# Patient Record
Sex: MALE | Race: WHITE | NOT HISPANIC OR LATINO | Employment: FULL TIME | URBAN - METROPOLITAN AREA
[De-identification: names, ages, dates, MRNs, and addresses within clinical notes are randomized per-mention and may not be internally consistent; named-entity substitution may affect disease eponyms.]

---

## 2019-03-16 ENCOUNTER — OFFICE VISIT (OUTPATIENT)
Dept: URGENT CARE | Facility: CLINIC | Age: 39
End: 2019-03-16
Payer: COMMERCIAL

## 2019-03-16 ENCOUNTER — APPOINTMENT (OUTPATIENT)
Dept: RADIOLOGY | Facility: CLINIC | Age: 39
End: 2019-03-16
Payer: COMMERCIAL

## 2019-03-16 VITALS
HEIGHT: 74 IN | DIASTOLIC BLOOD PRESSURE: 78 MMHG | RESPIRATION RATE: 18 BRPM | WEIGHT: 206 LBS | BODY MASS INDEX: 26.44 KG/M2 | SYSTOLIC BLOOD PRESSURE: 145 MMHG | HEART RATE: 98 BPM | TEMPERATURE: 98.7 F | OXYGEN SATURATION: 96 %

## 2019-03-16 DIAGNOSIS — S59.902A INJURY OF LEFT ELBOW, INITIAL ENCOUNTER: ICD-10-CM

## 2019-03-16 DIAGNOSIS — S59.902A INJURY OF LEFT ELBOW, INITIAL ENCOUNTER: Primary | ICD-10-CM

## 2019-03-16 PROCEDURE — 99203 OFFICE O/P NEW LOW 30 MIN: CPT | Performed by: PHYSICIAN ASSISTANT

## 2019-03-16 PROCEDURE — 73080 X-RAY EXAM OF ELBOW: CPT

## 2019-03-16 RX ORDER — LORAZEPAM 1 MG/1
TABLET ORAL
COMMUNITY
Start: 2019-01-14

## 2019-03-16 RX ORDER — DEXTROAMPHETAMINE SACCHARATE, AMPHETAMINE ASPARTATE, DEXTROAMPHETAMINE SULFATE AND AMPHETAMINE SULFATE 3.75; 3.75; 3.75; 3.75 MG/1; MG/1; MG/1; MG/1
TABLET ORAL
COMMUNITY
Start: 2019-02-13 | End: 2020-08-31 | Stop reason: ALTCHOICE

## 2019-03-16 RX ORDER — LAMOTRIGINE 200 MG/1
TABLET ORAL
COMMUNITY
Start: 2019-02-13 | End: 2020-08-31 | Stop reason: ALTCHOICE

## 2019-03-16 NOTE — PATIENT INSTRUCTIONS
RICE (rest, ice, compression, elevation) measures as discussed  Rest and elevate injured area as much as possible  Ice for 20-30 minutes at a time, 3-4 times per day  You may attempt to wear a compression device during all waking hours if swelling increases  Take  Advil or Tylenol as directed for pain  Follow up with your family doctor in 3-5 days  Proceed to the ER if symptoms worsen

## 2019-03-16 NOTE — PROGRESS NOTES
[facility]        NAME: Jazmin Mims is a 41 y o  male  : 1980    MRN: [id number]  DATE: 2019  TIME: 3:10 PM    Assessment and Plan   Injury of left elbow, initial encounter [Z41 902A]  1  Injury of left elbow, initial encounter  XR elbow 3+ vw left     Patient Instructions   RICE (rest, ice, compression, elevation) measures as discussed  Rest and elevate injured area as much as possible  Ice for 20-30 minutes at a time, 3-4 times per day  You may attempt to wear a compression device during all waking hours if swelling increases  Take  Advil or Tylenol as directed for pain  Follow up with your family doctor in 3-5 days  Proceed to the ER if symptoms worsen  The diagnosis, etiology, expected course of injury, and treatment plan were reviewed  All questions answered  Precautions given  Patient verbalized understanding and agreement the treatment plan  Chief Complaint     Chief Complaint   Patient presents with    Arm Pain     Pt reports of left elbow pain from a fall occurred approx 1 5 hours  Pt denies any other injuries or LOC  History of Present Illness   70-year-old male presenting with complaint of left elbow pain x1 5 hours  Patient reportedly slipped on a carpeted stair while going downstairs, and fell hitting his left elbow on an uncovered portions  He notes the carpeting is not nailed down, and is thus mobile  He notes immediate pain of the elbow with slight redness, but otherwise denies any swelling, bruising, N/T/W  He notes it is painful with movement but denies limitation of ROM  No previous injury  Review of Systems   Review of Systems   Constitutional: Negative for chills, diaphoresis, fatigue and fever  Respiratory: Negative for cough, shortness of breath and wheezing  Cardiovascular: Negative for chest pain and palpitations  Gastrointestinal: Negative for abdominal distention, nausea and vomiting  Musculoskeletal: Negative for myalgias  Skin: Negative for rash  Neurological: Negative for dizziness, seizures, syncope, weakness, light-headedness, numbness and headaches  Psychiatric/Behavioral: Negative for confusion and decreased concentration  Current Medications       Current Outpatient Medications:     amphetamine-dextroamphetamine (ADDERALL) 15 MG tablet, , Disp: , Rfl:     lamoTRIgine (LaMICtal) 150 MG tablet, , Disp: , Rfl:     LORazepam (ATIVAN) 1 mg tablet, , Disp: , Rfl:     Current Allergies     Allergies as of 03/16/2019    (No Known Allergies)            The following portions of the patient's history were reviewed and updated as appropriate: allergies, current medications, past family history, past medical history, past social history, past surgical history and problem list      Past Medical History:   Diagnosis Date    Anxiety     Bipolar 2 disorder ([facility] [address] )     Depression        History reviewed  No pertinent surgical history  History reviewed  No pertinent family history  Medications have been verified  Objective   /78   Pulse 98   Temp 98 7 °F (37 1 °C)   Resp 18   Ht 6' 2" (1 88 m)   Wt 93 4 kg (206 lb)   SpO2 96%   BMI 26 45 kg/m²     Left elbow XR: No acute osseous abn  Physical Exam     Physical Exam   Constitutional: He is oriented to person, place, and time  Vital signs are normal  He appears well-developed and well-nourished  He is cooperative  He does not appear ill  No distress  HENT:   Head: Normocephalic and atraumatic  Eyes: Conjunctivae and lids are normal  Right eye exhibits no chemosis, no discharge and no exudate  Left eye exhibits no chemosis, no discharge and no exudate  Right conjunctiva is not injected  Left conjunctiva is not injected  Cardiovascular: Normal rate, regular rhythm and normal heart sounds  Exam reveals no gallop, no distant heart sounds and no friction rub  No murmur heard  Pulmonary/Chest: Effort normal and breath sounds normal  No stridor   No respiratory distress  He has no decreased breath sounds  He has no wheezes  He has no rhonchi  He has no rales  Abdominal: Soft  Bowel sounds are normal  He exhibits no distension and no mass  There is no tenderness  There is no rigidity, no rebound and no guarding  Musculoskeletal:        Left elbow: He exhibits laceration (superficial abrasion of the olecranon process  )  He exhibits normal range of motion, no swelling, no effusion and no deformity  Tenderness found  Radial head and lateral epicondyle tenderness noted  No medial epicondyle and no olecranon process tenderness noted  Mild erythema of the posterior elbow  No overt deformity or other overlying skin abnormality  No crepitus  UE DTR's +2, symmetrical  Bicep and triceps strength 5/5   strength 5/5  Neurological: He is alert and oriented to person, place, and time  He has normal strength  He is not disoriented  No cranial nerve deficit  He exhibits normal muscle tone  Coordination and gait normal    Skin: Skin is warm, dry and intact  No rash noted  He is not diaphoretic  No erythema  No pallor  Psychiatric: He has a normal mood and affect  His behavior is normal  Judgment and thought content normal    Nursing note and vitals reviewed

## 2020-08-07 ENCOUNTER — TELEPHONE (OUTPATIENT)
Dept: UROLOGY | Facility: MEDICAL CENTER | Age: 40
End: 2020-08-07

## 2020-08-07 NOTE — TELEPHONE ENCOUNTER
Tried calling pt at number given to make new pt appt and got recording saying number is not in service

## 2020-08-07 NOTE — TELEPHONE ENCOUNTER
Please Triage - Tesitcular/ Penile Pain  New Patient- [place]    What is the reason for the patients appointment? Pain in base of penis and Testicle  Patient would like appt ASAP  Unable to locate appt to accomodate     Do we accept the patient's insurance or is the patient Self-Pay? Provider:  [employer] ID#: [id number]      Has the patient had any previous urologist(s)?  no     Have patient records been requested?  no     Has the patient had any outside testing done?  no     What is the patients location preference for an office visit?  [place]    Does the patient have a personal history of cancer?  no    (If no cancer hx   ) Is the patient ok with seeing Advanced Practitioner?  yes    Patient can be reached at : [phone]

## 2020-08-12 NOTE — TELEPHONE ENCOUNTER
Tried to call patient - unable to leave message due to phone not being in service  Will send him an email to call the office back

## 2020-08-27 ENCOUNTER — TELEPHONE (OUTPATIENT)
Dept: UROLOGY | Facility: MEDICAL CENTER | Age: 40
End: 2020-08-27

## 2020-08-27 ENCOUNTER — OFFICE VISIT (OUTPATIENT)
Dept: FAMILY MEDICINE CLINIC | Facility: CLINIC | Age: 40
End: 2020-08-27
Payer: COMMERCIAL

## 2020-08-27 VITALS
WEIGHT: 205 LBS | HEIGHT: 73 IN | DIASTOLIC BLOOD PRESSURE: 74 MMHG | TEMPERATURE: 98.2 F | OXYGEN SATURATION: 97 % | SYSTOLIC BLOOD PRESSURE: 116 MMHG | HEART RATE: 81 BPM | BODY MASS INDEX: 27.17 KG/M2

## 2020-08-27 DIAGNOSIS — R30.0 DYSURIA: ICD-10-CM

## 2020-08-27 DIAGNOSIS — N50.82 SCROTAL PAIN: Primary | ICD-10-CM

## 2020-08-27 LAB
BACTERIA UR QL AUTO: ABNORMAL /HPF
BILIRUB UR QL STRIP: NEGATIVE
CLARITY UR: ABNORMAL
COLOR UR: ABNORMAL
GLUCOSE UR STRIP-MCNC: NEGATIVE MG/DL
HGB UR QL STRIP.AUTO: ABNORMAL
HYALINE CASTS #/AREA URNS LPF: ABNORMAL /LPF
KETONES UR STRIP-MCNC: NEGATIVE MG/DL
LEUKOCYTE ESTERASE UR QL STRIP: NEGATIVE
NITRITE UR QL STRIP: NEGATIVE
NON-SQ EPI CELLS URNS QL MICRO: ABNORMAL /HPF
PH UR STRIP.AUTO: 6.5 [PH]
PROT UR STRIP-MCNC: NEGATIVE MG/DL
RBC #/AREA URNS AUTO: ABNORMAL /HPF
SL AMB  POCT GLUCOSE, UA: NEGATIVE
SL AMB LEUKOCYTE ESTERASE,UA: NEGATIVE
SL AMB POCT BILIRUBIN,UA: NEGATIVE
SL AMB POCT BLOOD,UA: ABNORMAL
SL AMB POCT CLARITY,UA: CLEAR
SL AMB POCT COLOR,UA: YELLOW
SL AMB POCT KETONES,UA: NEGATIVE
SL AMB POCT NITRITE,UA: NEGATIVE
SL AMB POCT PH,UA: 5
SL AMB POCT SPECIFIC GRAVITY,UA: 1.02
SL AMB POCT URINE PROTEIN: 15
SL AMB POCT UROBILINOGEN: 0.2
SP GR UR STRIP.AUTO: 1.02 (ref 1–1.03)
UROBILINOGEN UR QL STRIP.AUTO: 0.2 E.U./DL
WBC #/AREA URNS AUTO: ABNORMAL /HPF

## 2020-08-27 PROCEDURE — 81001 URINALYSIS AUTO W/SCOPE: CPT | Performed by: FAMILY MEDICINE

## 2020-08-27 PROCEDURE — 99214 OFFICE O/P EST MOD 30 MIN: CPT | Performed by: FAMILY MEDICINE

## 2020-08-27 PROCEDURE — 1036F TOBACCO NON-USER: CPT | Performed by: FAMILY MEDICINE

## 2020-08-27 PROCEDURE — 87591 N.GONORRHOEAE DNA AMP PROB: CPT | Performed by: FAMILY MEDICINE

## 2020-08-27 PROCEDURE — 3008F BODY MASS INDEX DOCD: CPT | Performed by: FAMILY MEDICINE

## 2020-08-27 PROCEDURE — 87086 URINE CULTURE/COLONY COUNT: CPT | Performed by: FAMILY MEDICINE

## 2020-08-27 PROCEDURE — 81002 URINALYSIS NONAUTO W/O SCOPE: CPT | Performed by: FAMILY MEDICINE

## 2020-08-27 PROCEDURE — 87491 CHLMYD TRACH DNA AMP PROBE: CPT | Performed by: FAMILY MEDICINE

## 2020-08-27 RX ORDER — DEXMETHYLPHENIDATE HYDROCHLORIDE 10 MG/1
TABLET ORAL
COMMUNITY
Start: 2020-07-21

## 2020-08-27 NOTE — TELEPHONE ENCOUNTER
Please Triage - Scrotal Pain  New Patient- Cm    What is the reason for the patients appointment? Scrotal Pain - 60 days  PCP referral, calling to schedule US  Will call back with date  Do we accept the patient's insurance or is the patient Self-Pay? Provider:  [facility]  Plan: [employer]  Member ID#: [de-identified]     Has the patient had any previous urologist(s)? No     Have patient records been requested? No     Has the patient had any outside testing done? No     What is the patients location preference for an office visit? [place]    Does the patient have a personal history of cancer? No    (If no cancer hx   ) Is the patient ok with seeing Advanced Practitioner?   Yes    Patient can be reached at : [phone]

## 2020-08-27 NOTE — TELEPHONE ENCOUNTER
Patient states he has been having it for 2 months - it get to a 6-7  Not all the time  Patient has no swelling   Scheduled 8/28 @ 876

## 2020-08-28 ENCOUNTER — HOSPITAL ENCOUNTER (OUTPATIENT)
Dept: ULTRASOUND IMAGING | Facility: HOSPITAL | Age: 40
Discharge: HOME | End: 2020-08-28
Payer: COMMERCIAL

## 2020-08-28 DIAGNOSIS — N50.82 SCROTAL PAIN: ICD-10-CM

## 2020-08-28 DIAGNOSIS — R30.0 DYSURIA: ICD-10-CM

## 2020-08-28 LAB — BACTERIA UR CULT: NORMAL

## 2020-08-28 PROCEDURE — 76870 US EXAM SCROTUM: CPT

## 2020-08-28 NOTE — TELEPHONE ENCOUNTER
Was able to scheduled him with Dr Joy Santiago on Monday   Emailed to [employer] and directions

## 2020-08-28 NOTE — TELEPHONE ENCOUNTER
Patient called and advised that he is having his US today at 1pm and would like to schedule an appointment  Please advise

## 2020-08-30 LAB
C TRACH DNA SPEC QL NAA+PROBE: NEGATIVE
N GONORRHOEA DNA SPEC QL NAA+PROBE: NEGATIVE

## 2020-08-31 ENCOUNTER — OFFICE VISIT (OUTPATIENT)
Dept: UROLOGY | Facility: AMBULATORY SURGERY CENTER | Age: 40
End: 2020-08-31
Payer: COMMERCIAL

## 2020-08-31 VITALS
WEIGHT: 204.4 LBS | HEIGHT: 73 IN | SYSTOLIC BLOOD PRESSURE: 112 MMHG | TEMPERATURE: 97.8 F | HEART RATE: 67 BPM | BODY MASS INDEX: 27.09 KG/M2 | DIASTOLIC BLOOD PRESSURE: 72 MMHG

## 2020-08-31 DIAGNOSIS — R31.29 MICROSCOPIC HEMATURIA: ICD-10-CM

## 2020-08-31 DIAGNOSIS — E29.1 TESTICULAR HYPOFUNCTION: ICD-10-CM

## 2020-08-31 DIAGNOSIS — N20.0 CALCULUS OF KIDNEY: ICD-10-CM

## 2020-08-31 DIAGNOSIS — N50.819 TESTICULAR PAIN: Primary | ICD-10-CM

## 2020-08-31 PROBLEM — N50.82 SCROTAL PAIN: Status: ACTIVE | Noted: 2020-08-31

## 2020-08-31 PROBLEM — N50.82 SCROTAL PAIN: Status: RESOLVED | Noted: 2020-08-31 | Resolved: 2020-08-31

## 2020-08-31 PROBLEM — R30.0 DYSURIA: Status: ACTIVE | Noted: 2020-08-31

## 2020-08-31 LAB
SL AMB  POCT GLUCOSE, UA: ABNORMAL
SL AMB LEUKOCYTE ESTERASE,UA: ABNORMAL
SL AMB POCT BILIRUBIN,UA: ABNORMAL
SL AMB POCT BLOOD,UA: + 3
SL AMB POCT CLARITY,UA: CLEAR
SL AMB POCT COLOR,UA: YELLOW
SL AMB POCT KETONES,UA: ABNORMAL
SL AMB POCT NITRITE,UA: ABNORMAL
SL AMB POCT PH,UA: 7.5
SL AMB POCT SPECIFIC GRAVITY,UA: 1.01
SL AMB POCT URINE PROTEIN: ABNORMAL
SL AMB POCT UROBILINOGEN: ABNORMAL

## 2020-08-31 PROCEDURE — 81002 URINALYSIS NONAUTO W/O SCOPE: CPT | Performed by: UROLOGY

## 2020-08-31 PROCEDURE — 99204 OFFICE O/P NEW MOD 45 MIN: CPT | Performed by: UROLOGY

## 2020-08-31 NOTE — PROGRESS NOTES
8/31/2020    Carleen Jenkins  1980  [id number]        Assessment  Resolved right orchalgia, history of nephrolithiasis, history of fatigue and concern for low testosterone, microscopic hematuria      Discussion  I provided the patient with reassurance that his scrotal/testicular examination and ultrasound are within normal limits  With regards to his microscopic hematuria and history of nephrolithiasis, I recommend obtaining a KUB, renal bladder ultrasound, and follow-up cystoscopy in the office  We discussed that if there is any sizable stone requiring surgical intervention in the operating room that cystoscopy could be performed at that time  The patient is concerned about his fatigue and possible low testosterone level which he believes he had in the past   I will obtain a testosterone and free testosterone level at this time  History of Present Illness  42 y o  male with a history of testicular pain  A recent scrotal ultrasound showed small bilateral hydroceles without any other pathology present  Urine dip in the office today reveals 3+ blood  He denies any gross hematuria  A recent microscopic urinalysis reveals 2-4 red blood cells per high-powered field  He complains of right-sided testicular discomfort associated with masturbation  This was somewhat transient and appears to have resolved  He reports a history of nephrolithiasis  There is no prior imaging available for my review  He is also concerned about a possible low testosterone level which he believes he may have had in the past   His concern is for his daily lethargy  He reports a history of medically treated bipolar disorder  AUA Symptom Score  AUA SYMPTOM SCORE      Most Recent Value   AUA SYMPTOM SCORE   How often have you had a sensation of not emptying your bladder completely after you finished urinating? 1   How often have you had to urinate again less than two hours after you finished urinating?   3   How often have you found you stopped and started again several times when you urinate?  0   How often have you found it difficult to postpone urination? 0   How often have you had a weak urinary stream?  1   How often have you had to push or strain to begin urination? 0   How many times did you most typically get up to urinate from the time you went to bed at night until the time you got up in the morning? 1   Quality of Life: If you were to spend the rest of your life with your urinary condition just the way it is now, how would you feel about that?  2   AUA SYMPTOM SCORE  6          Review of Systems  Review of Systems   Constitutional: Negative  HENT: Negative  Eyes: Negative  Respiratory: Negative  Cardiovascular: Negative  Gastrointestinal: Negative  Endocrine: Negative  Genitourinary:        Per HPI   Musculoskeletal: Negative  Skin: Negative  Allergic/Immunologic: Negative  Neurological: Negative  Hematological: Negative  Psychiatric/Behavioral: Negative  Past Medical History  Past Medical History:   Diagnosis Date    Anxiety     Bipolar 2 disorder ([facility] [address] )     Depression        Past Social History  History reviewed  No pertinent surgical history  Past Family History  History reviewed  No pertinent family history  Past Social history  Social History     Socioeconomic History    Marital status: Single     Spouse name: Not on file    Number of children: Not on file    Years of education: Not on file    Highest education level: Not on file   Occupational History    Not on file   Social Needs    Financial resource strain: Not on file    Food insecurity     Worry: Not on file     Inability: Not on file    Transportation needs     Medical: Not on file     Non-medical: Not on file   Tobacco Use    Smoking status: Former Smoker    Smokeless tobacco: Never Used   Substance and Sexual Activity    Alcohol use:  Yes    Drug use: Yes     Types: Marijuana    Sexual activity: Not on file   Lifestyle    Physical activity     Days per week: Not on file     Minutes per session: Not on file    Stress: Not on file   Relationships    Social connections     Talks on phone: Not on file     Gets together: Not on file     Attends Sabianist service: Not on file     Active member of club or organization: Not on file     Attends meetings of clubs or organizations: Not on file     Relationship status: Not on file    Intimate partner violence     Fear of current or ex partner: Not on file     Emotionally abused: Not on file     Physically abused: Not on file     Forced sexual activity: Not on file   Other Topics Concern    Not on file   Social History Narrative    Not on file       Current Medications  Current Outpatient Medications   Medication Sig Dispense Refill    LORazepam (ATIVAN) 1 mg tablet       dexmethylphenidate (FOCALIN) 10 MG tablet TK 1 T PO BID       No current facility-administered medications for this visit  Allergies  No Known Allergies    Past Medical History, Social History, Family History, medications and allergies were reviewed  Vitals  Vitals:    08/31/20 1630   BP: 112/72   BP Location: Left arm   Patient Position: Sitting   Cuff Size: Adult   Pulse: 67   Temp: 97 8 °F (36 6 °C)   TempSrc: Temporal   Weight: 92 7 kg (204 lb 6 4 oz)   Height: 6' 1" (1 854 m)       Physical Exam  Physical Exam  On examination he is in no acute distress  His abdomen is soft nontender nondistended   examination reveals normal phallus, scrotum and scrotal contents  There are no testicular masses  There are no inguinal hernias  Phallus is normal   Digital rectal examination was not performed  Skin is warm  Extremities without edema    Neurologic is grossly intact and nonfocal   Gait normal   Affect normal      Results  No results found for: PSA  No results found for: GLUCOSE, CALCIUM, NA, K, CO2, CL, BUN, CREATININE  No results found for: WBC, HGB, HCT, MCV, PLT      Office Urine Dip  Recent Results (from the past 1 hour(s))   POCT urine dip    Collection Time: 08/31/20  4:33 PM   Result Value Ref Range    LEUKOCYTE ESTERASE,UA trace     NITRITE,UA -     SL AMB POCT UROBILINOGEN -     POCT URINE PROTEIN -      PH,UA 7 5     BLOOD,UA + 3     SPECIFIC GRAVITY,UA 1 010     KETONES,UA -     BILIRUBIN,UA -     GLUCOSE, UA -      COLOR,UA yellow     CLARITY,UA clear    ]

## 2020-08-31 NOTE — ASSESSMENT & PLAN NOTE
Burning on urination for past few weeks  Not recently sexually active   Some blood in urine on UA    Plan as above

## 2020-08-31 NOTE — PROGRESS NOTES
Family Medicine Follow-Up Office Visit  Sena Mario 42 y o  male   MRN: [id number] : 1980  ENCOUNTER: 2020 8:17 AM    Assessment and Plan   Scrotal pain  2 month history of right scrotal pain  No pain on palpation and no evidence of inguinal hernia  Inciting factor was vigorous masturbation  Scrotal Trauma vs  Epididymitis     - Check US scrotum  - Check Chlam/Gonorrhea, POCT UA, urine culture,   - Refer to Urology for workup    Dysuria  Burning on urination for past few weeks  Not recently sexually active  Some blood in urine on UA    Plan as above      Chief Complaint     Chief Complaint   Patient presents with    Testicle Pain       History of Present Illness   Jameel Fong is a 42y o -year-old male who presents today for scrotal pain x2 months  States that 2 months ago, he was vigorously masturbating and felt a sharp pain upon ejaculation  Since then he has had pain in his scrotum and inguinal region on the right  Was given a referral for Urology at some point in the past 2 months but was unable to make appointment  Last sexually active 2019  States the pain normally occurs during masturbation and is sharp in nature, but does happen when he is at rest which is more of a dull pain  For the past few weeks, he has also complained of burning on urination  Denies any other symptoms  Review of Systems   Review of Systems   Constitutional: Negative for activity change, appetite change, chills, fatigue and fever  HENT: Negative for congestion, rhinorrhea, sneezing and sore throat  Eyes: Negative for pain, discharge, redness and itching  Respiratory: Negative for cough, chest tightness, shortness of breath and wheezing  Cardiovascular: Negative for chest pain and palpitations  Gastrointestinal: Negative for abdominal pain, constipation, diarrhea, nausea and vomiting  Genitourinary: Positive for dysuria, penile pain and testicular pain   Negative for decreased urine volume, difficulty urinating, flank pain, hematuria and scrotal swelling  Musculoskeletal: Negative for arthralgias, gait problem, myalgias and neck pain  Skin: Negative for rash  Neurological: Negative for dizziness, tremors, seizures, weakness, numbness and headaches  Hematological: Negative for adenopathy  Psychiatric/Behavioral: Negative for suicidal ideas  The patient is not nervous/anxious  Active Problem List     Patient Active Problem List   Diagnosis    Scrotal pain    Dysuria       Past Medical History, Past Surgical History, Family History, and Social History were reviewed and updated today as appropriate  Objective   /74   Pulse 81   Temp 98 2 °F (36 8 °C)   Ht 6' 1" (1 854 m)   Wt 93 kg (205 lb)   SpO2 97%   BMI 27 05 kg/m²     Physical Exam  Vitals signs reviewed  Constitutional:       General: He is not in acute distress  Appearance: He is well-developed  He is not diaphoretic  HENT:      Head: Normocephalic and atraumatic  Nose: Nose normal       Mouth/Throat:      Pharynx: No oropharyngeal exudate  Eyes:      General: No scleral icterus  Right eye: No discharge  Left eye: No discharge  Conjunctiva/sclera: Conjunctivae normal    Cardiovascular:      Rate and Rhythm: Normal rate and regular rhythm  Heart sounds: Normal heart sounds  No murmur  Pulmonary:      Effort: Pulmonary effort is normal  No respiratory distress  Breath sounds: Normal breath sounds  No wheezing  Abdominal:      General: Bowel sounds are normal  There is no distension  Palpations: Abdomen is soft  Tenderness: There is no abdominal tenderness  Genitourinary:     Penis: Normal        Scrotum/Testes: Normal    Musculoskeletal: Normal range of motion  General: No tenderness  Skin:     General: Skin is warm  Findings: No erythema or rash  Neurological:      Mental Status: He is alert     Psychiatric:         Behavior: Behavior normal  Pertinent Laboratory/Diagnostic Studies:  No results found for: GLUCOSE, BUN, CREATININE, CALCIUM, NA, K, CO2, CL  No results found for: ALT, AST, GGT, ALKPHOS, BILITOT    No results found for: WBC, HGB, HCT, MCV, PLT    No results found for: TSH    No results found for: CHOL  No results found for: TRIG  No results found for: HDL  No results found for: LDLCALC  No results found for: HGBA1C    Results for orders placed or performed in visit on 08/27/20   Chlamydia/GC amplified DNA by PCR    Specimen: Urine, Other   Result Value Ref Range    N gonorrhoeae, DNA Probe Negative Negative    Chlamydia trachomatis, DNA Probe Negative Negative   Urine culture    Specimen: Urine, Other   Result Value Ref Range    Urine Culture <10,000 cfu/ml     UA (URINE) with reflex to Scope   Result Value Ref Range    Color, UA Dk Yellow     Clarity, UA Cloudy     Specific [place], UA 1 023 1 003 - 1 030    pH, UA 6 5 4 5, 5 0, 5 5, 6 0, 6 5, 7 0, 7 5, 8 0    Leukocytes, UA Negative Negative    Nitrite, UA Negative Negative    Protein, UA Negative Negative mg/dl    Glucose, UA Negative Negative mg/dl    Ketones, UA Negative Negative mg/dl    Urobilinogen, UA 0 2 0 2, 1 0 E U /dl E U /dl    Bilirubin, UA Negative Negative    Blood, UA Trace (A) Negative   Urine Microscopic   Result Value Ref Range    RBC, UA 2-4 (A) None Seen, 0-5 /hpf    WBC, UA None Seen None Seen, 0-5, 5-55, 5-65 /hpf    Epithelial Cells None Seen None Seen, Occasional /hpf    Bacteria, UA None Seen None Seen, Occasional /hpf    Hyaline Casts, UA None Seen None Seen /lpf   POCT urine dip   Result Value Ref Range    LEUKOCYTE ESTERASE,UA negative     NITRITE,UA negative     SL AMB POCT UROBILINOGEN 0 2     POCT URINE PROTEIN 15      PH,UA 5 0     BLOOD,UA 5-10     SPECIFIC GRAVITY,UA 1 020     KETONES,UA negative     BILIRUBIN,UA negative     GLUCOSE, UA negative      COLOR,UA yellow     CLARITY,UA clear        Orders Placed This Encounter   Procedures    Chlamydia/GC amplified DNA by PCR    Urine culture    US scrotum and testicles    UA (URINE) with reflex to Scope    Urine Microscopic    Ambulatory referral to Urology    POCT urine dip         Current Medications     Current Outpatient Medications   Medication Sig Dispense Refill    lamoTRIgine (LaMICtal) 200 MG tablet       LORazepam (ATIVAN) 1 mg tablet       amphetamine-dextroamphetamine (ADDERALL) 15 MG tablet       dexmethylphenidate (FOCALIN) 10 MG tablet TK 1 T PO BID       No current facility-administered medications for this visit  ALLERGIES:  No Known Allergies    Health Maintenance     Health Maintenance   Topic Date Due    HIV Screening  09/05/1997    BMI: Followup Plan  09/05/2000    Annual Physical  09/05/2000    DTaP,Tdap,and Td Vaccines (1 - Tdap) 09/05/2003    Influenza Vaccine  07/01/2020    Depression Screening PHQ  08/27/2021    BMI: Adult  08/27/2021    Pneumococcal Vaccine: Pediatrics (0 to 5 Years) and At-Risk Patients (6 to 59 Years)  Aged Out    HIB Vaccine  Aged Out    Hepatitis B Vaccine  Aged Out    IPV Vaccine  Aged Out    Hepatitis A Vaccine  Aged Out    Meningococcal ACWY Vaccine  Aged Out    HPV Vaccine  Aged Out       There is no immunization history on file for this patient  Laurie Clark MD   [address]  8/31/2020  8:17 AM    Parts of this note were dictated using [employer] dictation software and may have sounds-like errors due to variation in pronunciation

## 2020-08-31 NOTE — ASSESSMENT & PLAN NOTE
2 month history of right scrotal pain  No pain on palpation and no evidence of inguinal hernia  Inciting factor was vigorous masturbation   Scrotal Trauma vs  Epididymitis     - Check US scrotum  - Check Chlam/Gonorrhea, POCT UA, urine culture,   - Refer to Urology for workup

## 2020-09-03 ENCOUNTER — OFFICE VISIT (OUTPATIENT)
Dept: FAMILY MEDICINE CLINIC | Facility: CLINIC | Age: 40
End: 2020-09-03
Payer: COMMERCIAL

## 2020-09-03 VITALS
WEIGHT: 206 LBS | SYSTOLIC BLOOD PRESSURE: 122 MMHG | DIASTOLIC BLOOD PRESSURE: 80 MMHG | BODY MASS INDEX: 27.3 KG/M2 | HEIGHT: 73 IN | OXYGEN SATURATION: 97 % | TEMPERATURE: 98.3 F | HEART RATE: 79 BPM

## 2020-09-03 DIAGNOSIS — Z11.4 SCREENING FOR HIV (HUMAN IMMUNODEFICIENCY VIRUS): ICD-10-CM

## 2020-09-03 DIAGNOSIS — Z00.00 ANNUAL PHYSICAL EXAM: Primary | ICD-10-CM

## 2020-09-03 DIAGNOSIS — N50.82 SCROTAL PAIN: ICD-10-CM

## 2020-09-03 DIAGNOSIS — Z23 ENCOUNTER FOR IMMUNIZATION: ICD-10-CM

## 2020-09-03 PROCEDURE — 99395 PREV VISIT EST AGE 18-39: CPT | Performed by: FAMILY MEDICINE

## 2020-09-03 PROCEDURE — 90715 TDAP VACCINE 7 YRS/> IM: CPT

## 2020-09-03 PROCEDURE — 90471 IMMUNIZATION ADMIN: CPT

## 2020-09-03 NOTE — PROGRESS NOTES
[address] [facility] [place]    NAME: Lakia Melgar  AGE: 42 y o  SEX: male  : 1980     DATE: 9/3/2020     Assessment and Plan:     Problem List Items Addressed This Visit        Other    Scrotal pain     History of scrotal pain on masturbation  Urine dip in office 1 week ago revealed blood  Seen by urology, recommended US kidney/bladder and KUB due to history of nephrolithiasis  Urology considering cystoscopy pending results of US and KUB  States his symptoms have improved in the last week  - Recommend completing testing ordered by Urology  - Tylenol PRN for pain  - Continue to follow up with urology    RTC in 1 month for follow up         Annual physical exam - Primary     Patient has not had annual physical exam in over 6 years  Currently has no complaints  Reports >10 years since last tdap    - Annual Physical Completed today  - Order lipid panel, bmp, and HIV testing  - Tdap administered today         Relevant Orders    Lipid panel    Basic metabolic panel      Other Visit Diagnoses     Screening for HIV (human immunodeficiency virus)        Relevant Orders    HIV 1/2 Antigen/Antibody (4th Generation) w Reflex [facility]    Encounter for immunization        Relevant Orders    TDAP VACCINE GREATER THAN OR EQUAL TO 8YO IM (Completed)          Immunizations and preventive care screenings were discussed with patient today  Appropriate education was printed on patient's after visit summary  Counseling:  Alcohol/drug use: discussed moderation in alcohol intake, the recommendations for healthy alcohol use, and avoidance of illicit drug use  Sexual health: discussed sexually transmitted diseases, partner selection, use of condoms, avoidance of unintended pregnancy, and contraceptive alternatives  · Exercise: the importance of regular exercise/physical activity was discussed  Recommend exercise 3-5 times per week for at least 30 minutes       BMI Counseling: Body mass index is 27 18 kg/m²  The BMI is above normal  Nutrition recommendations include encouraging healthy choices of fruits and vegetables, consuming healthier snacks and reducing intake of saturated and trans fat  Exercise recommendations include moderate physical activity 150 minutes/week  No pharmacotherapy was ordered  Return in about 4 weeks (around 10/1/2020)  Chief Complaint:     Chief Complaint   Patient presents with    Follow-up      History of Present Illness:     Adult Annual Physical   Patient here for a comprehensive physical exam  The patient states that he has not had a physical in over 6 years  He was recently seen by PCP and urology for testicular pain during masturbation and found to have blood in the urine  Testicular ultrasound revealed bilateral hydrocele  Awaiting KUB, US Kidney/Bladder results  He reports that he has not had a tetanus vaccine in over 10 years and requires one today  Denies any other complaints  Diet and Physical Activity  · Diet/Nutrition: frequent junk food, high fat diet and limited fruits/vegetables  · Exercise: no formal exercise  Depression Screening  PHQ-9 Depression Screening    PHQ-9:    Frequency of the following problems over the past two weeks:            General Health  · Sleep: sleeps well and gets 7-8 hours of sleep on average  · Hearing: normal - none   · Vision: most recent eye exam >1 year ago and wears glasses  · Dental: no dental visits for >1 year   Health  · History of STDs?: no      Review of Systems:     Review of Systems   Constitutional: Negative for activity change, appetite change, chills, fatigue and fever  HENT: Negative for congestion, rhinorrhea, sneezing and sore throat  Eyes: Negative for pain, discharge, redness and itching  Respiratory: Negative for cough, chest tightness, shortness of breath and wheezing  Cardiovascular: Negative for chest pain and palpitations  Gastrointestinal: Negative for abdominal pain, constipation, diarrhea, nausea and vomiting  Genitourinary: Negative for discharge, dysuria, hematuria, penile pain and testicular pain  Musculoskeletal: Negative for arthralgias, gait problem, myalgias and neck pain  Skin: Negative for rash  Neurological: Negative for dizziness, tremors, seizures, weakness, numbness and headaches  Psychiatric/Behavioral: Negative for suicidal ideas  The patient is not nervous/anxious  Past Medical History:     Past Medical History:   Diagnosis Date    Anxiety     B[facility] 2 disorder ([facility]     Depression       Past Surgical History:     No past surgical history on file  Social History:        Social History     Socioeconomic History    Marital status: Single     Spouse name: Not on file    Number of children: Not on file    Years of education: Not on file    Highest education level: Not on file   Occupational History    Not on file   Social Needs    Financial resource strain: Not on file    Food insecurity     Worry: Not on file     Inability: Not on file    Transportation needs     Medical: Not on file     Non-medical: Not on file   Tobacco Use    Smoking status: Former Smoker    Smokeless tobacco: Never Used   Substance and Sexual Activity    Alcohol use:  Yes    Drug use: Yes     Types: Marijuana    Sexual activity: Not on file   Lifestyle    Physical activity     Days per week: Not on file     Minutes per session: Not on file    Stress: Not on file   Relationships    Social connections     Talks on phone: Not on file     Gets together: Not on file     Attends [employer] service: Not on file     Active member of club or organization: Not on file     Attends meetings of clubs or organizations: Not on file     Relationship status: Not on file    Intimate partner violence     Fear of current or ex partner: Not on file     Emotionally abused: Not on file     Physically abused: Not on file     Forced sexual activity: Not on file   Other Topics Concern    Not on file   Social History Narrative    Not on file      Family History:     No family history on file  Current Medications:     Current Outpatient Medications   Medication Sig Dispense Refill    dexmethylphenidate (FOCALIN) 10 MG tablet TK 1 T PO BID      LORazepam (ATIVAN) 1 mg tablet        No current facility-administered medications for this visit  Allergies:     No Known Allergies   Physical Exam:     /80   Pulse 79   Temp 98 3 °F (36 8 °C)   Ht 6' 1" (1 854 m)   Wt 93 4 kg (206 lb)   SpO2 97%   BMI 27 18 kg/m²     Physical Exam  Vitals signs reviewed  Constitutional:       General: He is not in acute distress  Appearance: Normal appearance  He is not ill-appearing  HENT:      Head: Normocephalic and atraumatic  Nose: Nose normal  No congestion or rhinorrhea  Eyes:      Extraocular Movements: Extraocular movements intact  Conjunctiva/sclera: Conjunctivae normal       Pupils: Pupils are equal, round, and reactive to light  Cardiovascular:      Rate and Rhythm: Normal rate and regular rhythm  Heart sounds: No murmur  No friction rub  No gallop  Pulmonary:      Effort: Pulmonary effort is normal       Breath sounds: Normal breath sounds  No wheezing  Abdominal:      General: Abdomen is flat  There is no distension  Tenderness: There is no abdominal tenderness  Skin:     General: Skin is warm and dry  Coloration: Skin is not jaundiced  Neurological:      Mental Status: He is alert     Psychiatric:         Mood and Affect: Mood normal          Behavior: Behavior normal           MD Calos Kay

## 2020-09-03 NOTE — ASSESSMENT & PLAN NOTE
History of scrotal pain on masturbation  Urine dip in office 1 week ago revealed blood  Seen by urology, recommended US kidney/bladder and KUB due to history of nephrolithiasis  Urology considering cystoscopy pending results of US and KUB  States his symptoms have improved in the last week      - Recommend completing testing ordered by Urology  - Tylenol PRN for pain  - Continue to follow up with urology    RTC in 1 month for follow up

## 2020-09-03 NOTE — PATIENT INSTRUCTIONS

## 2020-09-08 ENCOUNTER — TELEPHONE (OUTPATIENT)
Dept: FAMILY MEDICINE CLINIC | Facility: CLINIC | Age: 40
End: 2020-09-08

## 2020-09-08 NOTE — TELEPHONE ENCOUNTER
Patient should still have ultrasound done to make sure there are no other stones present       Thank you

## 2020-09-08 NOTE — TELEPHONE ENCOUNTER
Patient called to inform you that he passed a kidney stone on Friday evening and wanted to know if he should still keep his ultrasound appointment tomorrow  Patient stated he prefers not to if its not necessary  Please advise  Thank you

## 2020-09-09 ENCOUNTER — OFFICE VISIT (OUTPATIENT)
Dept: FAMILY MEDICINE CLINIC | Facility: CLINIC | Age: 40
End: 2020-09-09
Payer: COMMERCIAL

## 2020-09-09 ENCOUNTER — TELEPHONE (OUTPATIENT)
Dept: FAMILY MEDICINE CLINIC | Facility: CLINIC | Age: 40
End: 2020-09-09

## 2020-09-09 ENCOUNTER — HOSPITAL ENCOUNTER (OUTPATIENT)
Dept: ULTRASOUND IMAGING | Facility: HOSPITAL | Age: 40
Discharge: HOME | End: 2020-09-09

## 2020-09-09 VITALS
HEIGHT: 73 IN | BODY MASS INDEX: 27.43 KG/M2 | SYSTOLIC BLOOD PRESSURE: 122 MMHG | WEIGHT: 207 LBS | RESPIRATION RATE: 16 BRPM | TEMPERATURE: 98.1 F | DIASTOLIC BLOOD PRESSURE: 80 MMHG | HEART RATE: 76 BPM

## 2020-09-09 DIAGNOSIS — R59.0 ADENOPATHY, CERVICAL: ICD-10-CM

## 2020-09-09 DIAGNOSIS — H60.332 ACUTE SWIMMER'S EAR OF LEFT SIDE: Primary | ICD-10-CM

## 2020-09-09 DIAGNOSIS — R31.29 MICROSCOPIC HEMATURIA: ICD-10-CM

## 2020-09-09 PROBLEM — H60.339 SWIMMER'S EAR: Status: ACTIVE | Noted: 2020-09-09

## 2020-09-09 PROCEDURE — 99213 OFFICE O/P EST LOW 20 MIN: CPT | Performed by: FAMILY MEDICINE

## 2020-09-09 RX ORDER — ONDANSETRON 4 MG/1
TABLET, ORALLY DISINTEGRATING ORAL
COMMUNITY
Start: 2020-09-04

## 2020-09-09 RX ORDER — TAMSULOSIN HYDROCHLORIDE 0.4 MG/1
CAPSULE ORAL
COMMUNITY
Start: 2020-09-04

## 2020-09-09 RX ORDER — HYDROCODONE BITARTRATE AND ACETAMINOPHEN 5; 325 MG/1; MG/1
TABLET ORAL
COMMUNITY
Start: 2020-09-04

## 2020-09-09 RX ORDER — CIPROFLOXACIN AND DEXAMETHASONE 3; 1 MG/ML; MG/ML
2 SUSPENSION/ DROPS AURICULAR (OTIC) 2 TIMES DAILY
Qty: 1 ML | Refills: 0 | Status: SHIPPED | OUTPATIENT
Start: 2020-09-09 | End: 2020-09-14

## 2020-09-09 RX ORDER — KETOROLAC TROMETHAMINE 10 MG/1
TABLET, FILM COATED ORAL
COMMUNITY
Start: 2020-09-04

## 2020-09-09 NOTE — ASSESSMENT & PLAN NOTE
This patient has an apparent otitis externa on the left  This may or may not be associated with the adenopathy which was palpated today  The patient will try Ciprodex drops x2  B i d  for 5 days  He will follow-up for recheck in 2 weeks  He has been advised to keep his ears  purposely dry, not use Q-tips, and to use cotton in his ears when showering

## 2020-09-09 NOTE — PROGRESS NOTES
Family Medicine Follow-Up Office Visit  Rosaura Ryan 47 y o  male   MRN: [id number] : 1980  ENCOUNTER: 2020 2:55 PM    Assessment and Plan   Swimmer's ear   This patient has an apparent otitis externa on the left  This may or may not be associated with the adenopathy which was palpated today  The patient will try Ciprodex drops x2  B i d  for 5 days  He will follow-up for recheck in 2 weeks  He has been advised to keep his ears  purposely dry, not use Q-tips, and to use cotton in his ears when showering  Adenopathy, cervical    Apparent mldly tender adenopathy left lateral cervical spine will be rechecked in 2 weeks  Chief Complaint     Chief Complaint   Patient presents with    Follow-up       History of Present Illness   Jameel Meyer is a 47y o -year-old male who presents today for a" lump" on the left side of his neck  The patient reports that approximately 1 week ago he started to feel what he describes as a nodule on the left side of his neck  It is mildly tender  He denies any similar findings elsewhere on his body  He denies fever, chills, sweats or other signs of infection  He does report however recently cleaning his ears with Q-tips and using drops to soften wax in his ears  He also does report in addition to chronic tinnitus, had periods where he seems to hear a "liquid moving around" in his ears  Of clinical significance is he recently passed a kidney stone and feels significantly improved with regard to previous UT symptoms reported  Review of Systems   Review of Systems   Constitutional: Negative for chills, fatigue and fever  HENT: Positive for tinnitus (Chronic intermittent)  Negative for ear discharge, ear pain and hearing loss  Cardiovascular: Negative for chest pain and leg swelling  Gastrointestinal: Negative for abdominal pain, constipation and diarrhea  Genitourinary: Negative for difficulty urinating and dysuria     Musculoskeletal: Negative for neck pain and neck stiffness  Neurological: Negative  Hematological: Positive for adenopathy ( left lower cervical /supraclavicular border)  All other systems reviewed and are negative  Active Problem List     Patient Active Problem List   Diagnosis    Scrotal pain    Dysuria    Annual physical exam    Swimmer's ear    Adenopathy, cervical       Past Medical History, Past Surgical History, Family History, and Social History were reviewed and updated today as appropriate  Objective   /80   Pulse 76   Temp 98 1 °F (36 7 °C)   Resp 16   Ht 6' 1" (1 854 m)   Wt 93 9 kg (207 lb)   BMI 27 31 kg/m²     Physical Exam  Vitals signs reviewed  HENT:      Head: Atraumatic  Ears:      Comments: There is considerable erythema of the left ear canal and surrounding otic membranes  There appears to be mild serous otitis but no apparent otitis media  Mouth/Throat:      Mouth: Mucous membranes are moist       Pharynx: No oropharyngeal exudate  Eyes:      General: No scleral icterus  Extraocular Movements: Extraocular movements intact  Neck:      Musculoskeletal: Neck supple  Cardiovascular:      Rate and Rhythm: Normal rate and regular rhythm  Heart sounds: No murmur  Pulmonary:      Effort: Pulmonary effort is normal       Breath sounds: Normal breath sounds  No wheezing  Abdominal:      General: Abdomen is flat  Bowel sounds are normal  There is no distension  Palpations: Abdomen is soft  Musculoskeletal:         General: Tenderness ( left paracervical musculature) present  Lymphadenopathy:      Cervical: Cervical adenopathy (  Approximate 1 cm lymph node palpable post cervical left for during the supraclavicular region  There is 1 5 cm softer nodule approximately 5 cm superior) present  Skin:     General: Skin is warm and dry  Findings: Rash present  Neurological:      General: No focal deficit present  Mental Status: He is alert     Psychiatric: Mood and Affect: Mood normal          Behavior: Behavior normal        Diabetic Foot Exam    Pertinent Laboratory/Diagnostic Studies:  No results found for: GLUCOSE, BUN, CREATININE, CALCIUM, NA, K, CO2, CL  No results found for: ALT, AST, GGT, ALKPHOS, BILITOT    No results found for: WBC, HGB, HCT, MCV, PLT    No results found for: TSH    No results found for: CHOL  No results found for: TRIG  No results found for: HDL  No results found for: LDLCALC  No results found for: HGBA1C    Results for orders placed or performed in visit on 08/31/20   POCT urine dip   Result Value Ref Range    LEUKOCYTE ESTERASE,UA trace     NITRITE,UA -     SL AMB POCT UROBILINOGEN -     POCT URINE PROTEIN -      PH,UA 7 5     BLOOD,UA + 3     SPECIFIC GRAVITY,UA 1 010     KETONES,UA -     BILIRUBIN,UA -     GLUCOSE, UA -      COLOR,UA yellow     CLARITY,UA clear        No orders of the defined types were placed in this encounter  Current Medications     Current Outpatient Medications   Medication Sig Dispense Refill    dexmethylphenidate (FOCALIN) 10 MG tablet TK 1 T PO BID      HYDROcodone-acetaminophen (NORCO) 5-325 mg per tablet       ketorolac (TORADOL) 10 mg tablet       LORazepam (ATIVAN) 1 mg tablet       ondansetron (ZOFRAN-ODT) 4 mg disintegrating tablet       tamsulosin (FLOMAX) 0 4 mg       ciprofloxacin-dexamethasone (CIPRODEX) otic suspension Administer 2 drops into the left ear 2 (two) times a day for 5 days 1 mL 0     No current facility-administered medications for this visit          ALLERGIES:  No Known Allergies    Health Maintenance     Health Maintenance   Topic Date Due    HIV Screening  09/05/1997    Influenza Vaccine  07/01/2020    Depression Screening PHQ  08/27/2021    BMI: Followup Plan  09/03/2021    Annual Physical  09/03/2021    BMI: Adult  09/09/2021    DTaP,Tdap,and Td Vaccines (2 - Td) 09/03/2030    Pneumococcal Vaccine: Pediatrics (0 to 5 Years) and At-Risk Patients (6 to 59 Years) Aged Out    HIB Vaccine  Aged Out    Hepatitis B Vaccine  Aged Out    IPV Vaccine  Aged Out    Hepatitis A Vaccine  Aged Out    Meningococcal ACWY Vaccine  Aged Out    HPV Vaccine  Aged [employer] History   Administered Date(s) Administered    Tdap 09/03/2020         Tejinder Hayden MD   [address]  9/9/2020  2:55 PM    Parts of this note were dictated using [employer] dictation software and may have sounds-like errors due to variation in pronunciation

## 2020-09-09 NOTE — TELEPHONE ENCOUNTER
US dept from [facility] called and asked if pt should be having the ultrasound that was ordered  Patient is there and stating he had testing [place] and the testing showed he had passed a stone  Pt is not having any pain or problem at this time and will be coming in for a visit at our office today  The initial testing was ordered by Dr Shellie Lees and therefore I believe it would be Dr Shellie Lees who should be making the decision as to what additional testing is needed  Please review as this patient has a 2 pm appointment in our office  Thank you

## 2020-09-09 NOTE — TELEPHONE ENCOUNTER
Dr Dulce Mott please review  This patient states he passed a stone and has a high copay did not want to repeat testing if not needed  He is in our office today and we are trying to retrieive the records for his chart  If you can provide patient with your recommendations

## 2020-09-09 NOTE — TELEPHONE ENCOUNTER
I don't know when his testing in [place] was but Dr Carolyn Jimenes [facility] Urologist) saw recently  He had microhematuria and because of history of stones recommended he ha a KUB (plain x-ray), ultrasound, and possibly cystoscopy  This is well documented in his note and hence would defer to him if patient is hesitant or refusing to get

## 2020-09-29 ENCOUNTER — OFFICE VISIT (OUTPATIENT)
Dept: FAMILY MEDICINE CLINIC | Facility: CLINIC | Age: 40
End: 2020-09-29
Payer: COMMERCIAL

## 2020-09-29 VITALS
TEMPERATURE: 97.3 F | OXYGEN SATURATION: 97 % | BODY MASS INDEX: 27.57 KG/M2 | SYSTOLIC BLOOD PRESSURE: 120 MMHG | DIASTOLIC BLOOD PRESSURE: 74 MMHG | WEIGHT: 208 LBS | HEIGHT: 73 IN | HEART RATE: 101 BPM

## 2020-09-29 DIAGNOSIS — N50.82 SCROTAL PAIN: Primary | ICD-10-CM

## 2020-09-29 DIAGNOSIS — L05.91 PILONIDAL CYST: ICD-10-CM

## 2020-09-29 PROCEDURE — 99213 OFFICE O/P EST LOW 20 MIN: CPT | Performed by: FAMILY MEDICINE

## 2020-09-29 RX ORDER — LAMOTRIGINE 200 MG/1
TABLET ORAL
COMMUNITY
Start: 2020-09-17

## 2020-09-29 NOTE — ASSESSMENT & PLAN NOTE
Patient states that since passing a kidney stone, his pain has decreased significantly  Occasional pain when penis is erected that sometimes extends to the scrotum  Is improving    - Follows up with urology  - States he may not get cystoscopy done since his pain has mostly subsided   I advised against this and he should have testing done  - Will follow up with urology

## 2020-09-29 NOTE — PROGRESS NOTES
Family Medicine Follow-Up Office Visit  Jordyn Carreon 47 y o  male   MRN: [id number] : 1980  ENCOUNTER: 2020 2:47 PM    Assessment and Plan   Scrotal pain  Patient states that since passing a kidney stone, his pain has decreased significantly  Occasional pain when penis is erected that sometimes extends to the scrotum  Is improving    - Follows up with urology  - States he may not get cystoscopy done since his pain has mostly subsided  I advised against this and he should have testing done  - Will follow up with urology     Pilonidal cyst  0 5 inch cyst left superior gluteal crease that is healed over  States it sometimes gets inflamed and drains itself  - Offered general surgery referral, patient declined  - Discussed keeping area clean  - Will offer referral if patient is interested in future    RTC PRN      Chief Complaint     Chief Complaint   Patient presents with    Follow-up       History of Present Illness   Jameel Jeffers is a 47y o -year-old male who presents today for a follow up  States that he had passed a stone and followed up with urology  He reports that his symptoms mostly went away after passing the stone but does still have some occasional penile pain when he has an erection  Today, he reports having a pilonidal cyst on his lower back that comes and goes  No other complaints  Review of Systems   Review of Systems   Constitutional: Negative for activity change, appetite change, chills, fatigue and fever  HENT: Negative for congestion, rhinorrhea, sneezing and sore throat  Eyes: Negative for pain, discharge, redness and itching  Respiratory: Negative for cough, chest tightness, shortness of breath and wheezing  Cardiovascular: Negative for chest pain and palpitations  Gastrointestinal: Negative for abdominal pain, constipation, diarrhea, nausea and vomiting  Musculoskeletal: Negative for arthralgias, gait problem, myalgias and neck pain  Skin: Negative for rash  Cyst on lower back   Neurological: Negative for dizziness, seizures, weakness, numbness and headaches  Hematological: Negative for adenopathy  Psychiatric/Behavioral: The patient is not nervous/anxious  All other systems reviewed and are negative  Active Problem List     Patient Active Problem List   Diagnosis    Scrotal pain    Dysuria    Annual physical exam    Swimmer's ear    Adenopathy, cervical    Pilonidal cyst       Past Medical History, Past Surgical History, Family History, and Social History were reviewed and updated today as appropriate  Objective   /74   Pulse 101   Temp (!) 97 3 °F (36 3 °C)   Ht 6' 1" (1 854 m)   Wt 94 3 kg (208 lb)   SpO2 97%   BMI 27 44 kg/m²     Physical Exam  Vitals signs reviewed  Constitutional:       General: He is not in acute distress  Appearance: He is well-developed  He is not diaphoretic  HENT:      Head: Normocephalic and atraumatic  Nose: Nose normal       Mouth/Throat:      Pharynx: No oropharyngeal exudate  Eyes:      General: No scleral icterus  Right eye: No discharge  Left eye: No discharge  Conjunctiva/sclera: Conjunctivae normal    Cardiovascular:      Rate and Rhythm: Normal rate and regular rhythm  Heart sounds: Normal heart sounds  No murmur  Pulmonary:      Effort: Pulmonary effort is normal  No respiratory distress  Breath sounds: Normal breath sounds  No wheezing  Abdominal:      General: There is no distension  Palpations: Abdomen is soft  Tenderness: There is no abdominal tenderness  Musculoskeletal: Normal range of motion  General: No tenderness  Skin:     General: Skin is warm  Findings: Lesion present  No erythema or rash  Comments: 0 5 inch pilonidal cyst on left superior gluteal cleft that is crusted over   Neurological:      Mental Status: He is alert     Psychiatric:         Behavior: Behavior normal            Pertinent Laboratory/Diagnostic Studies:  No results found for: GLUCOSE, BUN, CREATININE, CALCIUM, NA, K, CO2, CL  No results found for: ALT, AST, GGT, ALKPHOS, BILITOT    No results found for: WBC, HGB, HCT, MCV, PLT    No results found for: TSH    No results found for: CHOL  No results found for: TRIG  No results found for: HDL  No results found for: LDLCALC  No results found for: HGBA1C    Results for orders placed or performed in visit on 08/31/20   POCT urine dip   Result Value Ref Range    LEUKOCYTE ESTERASE,UA trace     NITRITE,UA -     SL AMB POCT UROBILINOGEN -     POCT URINE PROTEIN -      PH,UA 7 5     BLOOD,UA + 3     SPECIFIC GRAVITY,UA 1 010     KETONES,UA -     BILIRUBIN,UA -     GLUCOSE, UA -      COLOR,UA yellow     CLARITY,UA clear        No orders of the defined types were placed in this encounter  Current Medications     Current Outpatient Medications   Medication Sig Dispense Refill    ciprofloxacin-dexamethasone (CIPRODEX) otic suspension Administer 2 drops into the left ear 2 (two) times a day for 5 days 1 mL 0    dexmethylphenidate (FOCALIN) 10 MG tablet TK 1 T PO BID      HYDROcodone-acetaminophen (NORCO) 5-325 mg per tablet       ketorolac (TORADOL) 10 mg tablet       lamoTRIgine (LaMICtal) 200 MG tablet TK 2 TS PO D      LORazepam (ATIVAN) 1 mg tablet       ondansetron (ZOFRAN-ODT) 4 mg disintegrating tablet       tamsulosin (FLOMAX) 0 4 mg        No current facility-administered medications for this visit          ALLERGIES:  No Known Allergies    Health Maintenance     Health Maintenance   Topic Date Due    HIV Screening  09/05/1997    Influenza Vaccine  07/01/2020    Depression Screening PHQ  08/27/2021    BMI: Followup Plan  09/03/2021    Annual Physical  09/03/2021    BMI: Adult  09/29/2021    DTaP,Tdap,and Td Vaccines (2 - Td) 09/03/2030    Pneumococcal Vaccine: Pediatrics (0 to 5 Years) and At-Risk Patients (6 to 59 Years)  Aged Out    HIB Vaccine  Kimberly Bill Hepatitis B Vaccine  Aged Out    IPV Vaccine  Aged Out    Hepatitis A Vaccine  Aged Out    Meningococcal ACWY Vaccine  Aged Out    HPV Vaccine  Aged Out     Immunization History   Administered Date(s) Administered    Tdap 09/03/2020         Anastasiia Uriostegui MD   [address]  9/29/2020  2:47 PM    Parts of this note were dictated using [employer] dictation software and may have sounds-like errors due to variation in pronunciation

## 2020-09-29 NOTE — ASSESSMENT & PLAN NOTE
0 5 inch cyst left superior gluteal crease that is healed over  States it sometimes gets inflamed and drains itself      - Offered general surgery referral, patient declined  - Discussed keeping area clean  - Will offer referral if patient is interested in future    RTC PRN

## 2020-10-02 NOTE — TELEPHONE ENCOUNTER
Pt was informed of Dr Mahi Gotti request  Pt states he had this done in [facility] and does not want to repeat  We are still waiting for records

## 2020-10-30 ENCOUNTER — TELEPHONE (OUTPATIENT)
Dept: UROLOGY | Facility: AMBULATORY SURGERY CENTER | Age: 40
End: 2020-10-30